# Patient Record
Sex: FEMALE | Race: WHITE
[De-identification: names, ages, dates, MRNs, and addresses within clinical notes are randomized per-mention and may not be internally consistent; named-entity substitution may affect disease eponyms.]

---

## 2018-05-31 ENCOUNTER — HOSPITAL ENCOUNTER (OUTPATIENT)
Dept: HOSPITAL 95 - LAB SHORT | Age: 73
End: 2018-05-31
Attending: DERMATOLOGY
Payer: MEDICARE

## 2018-05-31 DIAGNOSIS — D48.5: Primary | ICD-10-CM

## 2019-08-12 NOTE — NUR
SUMMARY
PT IS A/O X4, PLEASANT AFFECT. DX CVA. SHE STATE SLURRED SPEECH, L FACIAL
DROOP & EXCESS SALIVA SYMPTOMS HAVE IMPROVED SINCE THIS AM. SHE HAD REG FOODS
ORDERED @ [facility] HOWEVER ENCOURAGED TO EAT ONLY SOFT FOODS, EDUCATED ON RISK
FOR ASPIRATION. SHE STATE SOME DIFFICULTY w SWALLOWING. DR FREEMAN ORDER NPO
& ST EVAL. PT VERBALIZE UNDERSTANDING. IV D51/2NS INFUSING @ 100 ML/HR. VSS.

## 2019-08-13 NOTE — NUR
SHIFT SUMMARY
 
PT CONTINUES TO COMPLAIN OF HEADACHE. TYLENOL ORDER WAS OBTAINED. THIS RN
SPOKE TO DR. FREEMAN, AT HE WAS AGREEABLE TO ADVANCE THE PT'S DIET AS
TOLERATED. PT HASN'T HAD ANY SWALLOWING PROBLEMS FOR THIS RN. NO CALLS
RECIEVED BY THIS RN ON TELEMETRY. WILL CONTINUE TO MONITOR PT.

## 2019-08-13 NOTE — NUR
SUMMARY
PT'S SPEECH CONTINUES SOMEWHAT SLURRED TODAY HOWEVER IMPROVED FROM ADMIT. THIS
AM ST IN FOR EVAL, PLACED HER ON MECH SOFT DIET, MEDS w APPLESAUCE, NO STRAWS.
PT ENCOURAGED TO STI IN CHAIR FOR MEALS. DR FREEMAN ORDER CAROTID US & ECHO.
IVF'S STOPPED NOW THAT PT NO LONGER NPO. PT/OT EVAL/TX TODAY. PT STATE THAT
SHE RECOGNIZES ADDITIONAL COGNITIVE DEFICITS DURING THERAPY. SHE IS A/O X4,
PLEASANT AFFECT, POSITIVE ATTITUDE. SHE @ X'S HAS BEEN EMOTIONAL R/T CVA EVENT
& HER DAUGHTERS RECENT CANCER DX. REASSURANCE PROVIDED. VSS.

## 2019-08-14 NOTE — NUR
SHIFT SUMMARY
PLAN IS FOR PT TO WORK WITH HOME HEALTH WHEN SHE RETURNS HOME. DISCHARGE
PLANNING INFORMS ME THAT THIS WILL LIKELY BE SET UP TOMORROW. SHE MAY THEN DC
TOMMOROW. SHE IS A&O X4 BUT DOES HAVE SOME DEFICITS/WORD SEARCHING ISSUES.
PHYSICALLY SHE DENIES LINGERING SYMPTOMS. SHE IS INDEPENDENT IN THE ROOM. SHE
IS EAGER TO GO HOME.

## 2019-08-14 NOTE — NUR
Rn summary:  Patient is alert and oriented.  She continues to have some
slurred speach but states it is much improved.  No physical weakness noted,
very sl left facial droop.  Pt is able to take meds whole with applesauce and
drinks thin liquids without difficulty. Pt is up independantly in room voiding
/QS.  Pt is on tele, rhythm SBrady rate in the 50's.  Pt c/o headache this am
7/10 in temples.  Medicated with tylenol 650mg.  Pt has been restling quietly.
 Calls appropriately with call light in reach.  Will continue to monitor,

## 2019-08-15 NOTE — NUR
Shift summary.  Pt has had a good night and has been able to sleep.  Pt states
she has some trouble finding the word she wants to use but was not noticeable
by nurse.  Minimal facial droop noted.  Pt states that it has improved.  No
neuro deficits strength wise noted.  Pt up ad daniel without problems.  Pt
anticipating d/c today.

## 2019-08-15 NOTE — NUR
DISCHARGE NOTE
IV DC'D WNL. MEDICATIONS FAXED TO PREFERED PHARMACY. PT &  PROVIDED
WITH HARDCOPY AND VERBAL INSTRUCTIONS FOR DISCHARGE RE: MEDICATIONS, FOLLOW UP
APPOINTMENTS, DIAGNOSES, HOME HEALTH & THERAPIES. PERSONAL POSSESSIONS
GATHERED. PT DRESSED INDEPENDENTLY IN PERSONAL CLOTHING. PT ASSISTED OUT VIA
WHEELCHAIR BY NURSING ASSISTANT. PT &  HAD NO FURTHER QUESTIONS.